# Patient Record
Sex: FEMALE
[De-identification: names, ages, dates, MRNs, and addresses within clinical notes are randomized per-mention and may not be internally consistent; named-entity substitution may affect disease eponyms.]

---

## 2019-05-23 PROBLEM — Z00.00 ENCOUNTER FOR PREVENTIVE HEALTH EXAMINATION: Status: ACTIVE | Noted: 2019-05-23

## 2019-06-01 ENCOUNTER — APPOINTMENT (OUTPATIENT)
Dept: NEUROLOGY | Facility: CLINIC | Age: 19
End: 2019-06-01
Payer: MEDICAID

## 2019-06-01 VITALS
HEART RATE: 80 BPM | DIASTOLIC BLOOD PRESSURE: 79 MMHG | SYSTOLIC BLOOD PRESSURE: 115 MMHG | WEIGHT: 161 LBS | HEIGHT: 61 IN | BODY MASS INDEX: 30.4 KG/M2

## 2019-06-01 DIAGNOSIS — G40.109 LOCALIZATION-RELATED (FOCAL) (PARTIAL) SYMPTOMATIC EPILEPSY AND EPILEPTIC SYNDROMES WITH SIMPLE PARTIAL SEIZURES, NOT INTRACTABLE, W/OUT STATUS EPILEPTICUS: ICD-10-CM

## 2019-06-01 PROCEDURE — 99204 OFFICE O/P NEW MOD 45 MIN: CPT

## 2019-06-01 NOTE — PHYSICAL EXAM
[FreeTextEntry1] : neuro- alert and oriented x3\par STM intact\par attn conc lang nl\par Cn intct in detail\par Motor 5/5\par sens intact in detail\par CSG wnl

## 2019-06-01 NOTE — HISTORY OF PRESENT ILLNESS
[FreeTextEntry1] : cc- r/o seizures\par \par 18 y/o Rh college student had a first episode in 11th grade.  Began with nausea, lightheadedness, sweating followed by fall and very brief LOC without any motor activity , tongue bite or incontinence.\par \par Last October had three tests and high anxiety.  May not have eaten anything?dehydrated.  Was headed out of school and had her typical symptoms but more intense and went to the [place] but again fell and likely hit the right side of her head. This time not clear how long was out and believes it could have been 30 mins. EMS brought her to [facility]- discharged and told to see neurology but did not follow up.\par \par On Jan 15th had a throbbing vertex HA with photophobia. Had some slight nausea. After several hours went to an urgent care. Was given a pill and she recovered over next several hours.\par \par On January 19th finished class and had another throbbing HA, photophobia, lightheaded, nausea and then vision closed in and lost consciousness and fell.  Witnesses say that she was shivering. Hit her head on hair dryer. Not clear how long was out. EMS arrived. Had negative CT head and released.\par \par On Jan 31st had another throbbing HA with nausea, photophobia followed by another ?syncopal event with shivering.\par A neurologist saw her in the ED and she was started on Keppra but never took it.\par \par Had one more in March and two in April.  The event in March occurred again with stress and not eating and was much like the October. The episode on April 19th was finals week and occurred in cafeteria- started with HA .\par \par No events in May but two days ago had a throbbing HA at work but no LOC. Tylenol helped. \par \par Past Neuro Hx- HAs in 4th grade- blamed on glasses.\par Risk factors- no feb seizures, no head trauma\par FH- Father- prob migraine, two sisters 15 and 18 no HA or migraine\par \par \par meds- none\par all- nkda

## 2019-06-01 NOTE — ASSESSMENT
[FreeTextEntry1] : A/P- 18 y/o woman with migraine as well as episodes of LOC with and without the HAs that likely represent vasovagal episodes some initiated by the migraine pain.  Has had episodes with "shivering" which is also likely vagal but no expert witnesses as well as one episode reportedly lasting as long as 30 mins. Considering the slightly increased incidence of epilepsy with migraine and these unusual episodes it is reasonable to r/o a seizure disorder.\par - 48 hr ambulatory EEG\par - MRI brain\par -wants to hold off on abortive and prophylactic migraine meds\par - discussed daily exercise, hydration, increased salt intake on stressful days, and immediate postural changes for vasogagal symptoms.\par RTC 6 weeks

## 2019-06-21 ENCOUNTER — APPOINTMENT (OUTPATIENT)
Dept: NEUROLOGY | Facility: CLINIC | Age: 19
End: 2019-06-21